# Patient Record
Sex: FEMALE | Race: BLACK OR AFRICAN AMERICAN | Employment: OTHER | URBAN - METROPOLITAN AREA
[De-identification: names, ages, dates, MRNs, and addresses within clinical notes are randomized per-mention and may not be internally consistent; named-entity substitution may affect disease eponyms.]

---

## 2017-12-14 ENCOUNTER — ED HISTORICAL/CONVERTED ENCOUNTER (OUTPATIENT)
Dept: OTHER | Age: 82
End: 2017-12-14

## 2019-09-06 ENCOUNTER — ED HISTORICAL/CONVERTED ENCOUNTER (OUTPATIENT)
Dept: OTHER | Age: 84
End: 2019-09-06

## 2019-09-09 ENCOUNTER — ED HISTORICAL/CONVERTED ENCOUNTER (OUTPATIENT)
Dept: OTHER | Age: 84
End: 2019-09-09

## 2020-09-18 ENCOUNTER — HOSPITAL ENCOUNTER (EMERGENCY)
Age: 85
Discharge: HOME | End: 2020-09-18
Attending: EMERGENCY MEDICINE
Payer: MEDICARE

## 2020-09-18 VITALS
WEIGHT: 195 LBS | HEART RATE: 89 BPM | OXYGEN SATURATION: 99 % | BODY MASS INDEX: 33.29 KG/M2 | DIASTOLIC BLOOD PRESSURE: 68 MMHG | HEIGHT: 64 IN | RESPIRATION RATE: 16 BRPM | SYSTOLIC BLOOD PRESSURE: 158 MMHG | TEMPERATURE: 97.9 F

## 2020-09-18 DIAGNOSIS — S61.310A LACERATION OF RIGHT INDEX FINGER WITHOUT FOREIGN BODY WITH DAMAGE TO NAIL, INITIAL ENCOUNTER: Primary | ICD-10-CM

## 2020-09-18 PROCEDURE — 74011000250 HC RX REV CODE- 250

## 2020-09-18 PROCEDURE — 99282 EMERGENCY DEPT VISIT SF MDM: CPT

## 2020-09-18 PROCEDURE — 74011250636 HC RX REV CODE- 250/636: Performed by: EMERGENCY MEDICINE

## 2020-09-18 PROCEDURE — 90715 TDAP VACCINE 7 YRS/> IM: CPT | Performed by: EMERGENCY MEDICINE

## 2020-09-18 RX ORDER — FUROSEMIDE 20 MG/1
TABLET ORAL DAILY
COMMUNITY

## 2020-09-18 RX ORDER — BACITRACIN 500 [USP'U]/G
OINTMENT TOPICAL
Status: DISCONTINUED | OUTPATIENT
Start: 2020-09-18 | End: 2020-09-18

## 2020-09-18 RX ORDER — SIMVASTATIN 40 MG/1
40 TABLET, FILM COATED ORAL
COMMUNITY

## 2020-09-18 RX ORDER — GUAIFENESIN 100 MG/5ML
81 LIQUID (ML) ORAL DAILY
COMMUNITY

## 2020-09-18 RX ORDER — HYDRALAZINE HYDROCHLORIDE 100 MG/1
10 TABLET, FILM COATED ORAL
COMMUNITY

## 2020-09-18 RX ORDER — BACITRACIN 500 UNIT/G
PACKET (EA) TOPICAL
Status: COMPLETED
Start: 2020-09-18 | End: 2020-09-18

## 2020-09-18 RX ADMIN — BACITRACIN 1 PACKET: 500 OINTMENT TOPICAL at 13:02

## 2020-09-18 RX ADMIN — TETANUS TOXOID, REDUCED DIPHTHERIA TOXOID AND ACELLULAR PERTUSSIS VACCINE, ADSORBED 0.5 ML: 5; 2.5; 8; 8; 2.5 SUSPENSION INTRAMUSCULAR at 13:01

## 2020-09-18 NOTE — LETTER
[address] [address] [place] [id number] [phone] Work/School Note Date: 9/18/2020 To Whom It May concern: 
 
Brianda Brewer. Kvng's mother  was seen and treated today in the emergency room by the following provider(s): 
Attending Provider: Antelmo Youngblood MD. Brianda Brewer. Herbert Guan can return to work 9- Sincerely, CANDIGZAP AGUERO

## 2020-09-18 NOTE — ED TRIAGE NOTES
Minor lac to right second finger from pill cutter blade, ems wrapped finger. Bleeding controlled at triage

## 2020-09-18 NOTE — ED PROVIDER NOTES
EMERGENCY DEPARTMENT HISTORY AND PHYSICAL EXAM 
 
 
Date: 9/18/2020 Patient Name: Ricky Wilcox History of Presenting Illness Chief Complaint Patient presents with  Laceration History Provided By: Patient HPI: Ricky Wilcox, 81 y.o. female with a past medical history significant hypertension presents to the ED with cc of right index finger laceration. Occurred approximately 1 hour prior to arrival while patient was attempting to use her pill splitter. She has applied pressure but was unable to get the bleeding to stop. Bleeding is controlled now. She does take a baby aspirin a day. She denies any other injuries. He is unsure when her last tetanus booster was. PCP: No primary care provider on file. No current facility-administered medications on file prior to encounter. Current Outpatient Medications on File Prior to Encounter Medication Sig Dispense Refill  aspirin 81 mg chewable tablet Take 81 mg by mouth daily.  furosemide (Lasix) 20 mg tablet Take  by mouth daily.  hydrALAZINE (APRESOLINE) 100 mg tablet Take 10 mg by mouth.  simvastatin (ZOCOR) 40 mg tablet Take 40 mg by mouth nightly. Past History Past Medical History: 
Past Medical History:  
Diagnosis Date  Diabetes ([address]  Hypertension Past Surgical History: No past surgical history on file. Family History: No family history on file. Social History: 
Social History Tobacco Use  Smoking status: Never Smoker  Smokeless tobacco: Never Used Substance Use Topics  Alcohol use: Not on file  Drug use: Never Allergies: Allergies no known allergies Review of Systems Review of Systems Neurological: Negative for dizziness, weakness and numbness. Hematological: Does not bruise/bleed easily. All other systems reviewed and are negative. Physical Exam  
Physical Exam 
Constitutional:   
   Appearance: Normal appearance.   
HENT:  
 Head: Normocephalic and atraumatic. Cardiovascular:  
   Rate and Rhythm: Normal rate and regular rhythm. Pulses: Normal pulses. Pulmonary:  
   Effort: Pulmonary effort is normal.  
Skin: 
   General: Skin is warm and dry. Capillary Refill: Capillary refill takes less than 2 seconds. Comments: 0.5 cm superficial   Linear laceration to tip of right index finger and into distal  Portion of fingernail. No nailbed injury Neurological:  
   General: No focal deficit present. Mental Status: She is alert and oriented to person, place, and time. Psychiatric:     
   Mood and Affect: Mood normal.     
   Behavior: Behavior normal.  
 
 
 
Diagnostic Study Results Labs - No results found for this or any previous visit (from the past 12 hour(s)). Radiologic Studies - No orders to display CT Results  (Last 48 hours) None CXR Results  (Last 48 hours) None Medical Decision Making I reviewed the vital signs, available nursing notes, past medical history, past surgical history, family history and social history. Vital Signs-Reviewed the patient's vital signs. Patient Vitals for the past 12 hrs: 
 Temp Resp BP SpO2  
09/18/20 1224 97.9 °F (36.6 °C) 16 (!) 168/63 99 % Records Reviewed: Nursing Notes Provider Notes (Medical Decision Making):  
Patient with superficial laceration to her distal right index finger. Bleeding controlled. Wound does not require suturing or adhesive. Bacitracin dressing and finger splint applied. Splint is to protect wound from further blunt trauma. Discussed local wound care with patient. ED Course:  
Initial assessment performed. The patients presenting problems have been discussed, and they are in agreement with the care plan formulated and outlined with them. I have encouraged them to ask questions as they arise throughout their visit. PLAN: 
1. Current Discharge Medication List  
  
 
2. Follow-up Information Follow up With Specialties Details Why Contact Info FOLLOW UP WITH YOUR DOCTOR AS NEEDED Return to ED if worse Diagnosis Clinical Impression: 1. Laceration of right index finger without foreign body with damage to nail, initial encounter

## 2020-09-22 ENCOUNTER — HOSPITAL ENCOUNTER (OUTPATIENT)
Dept: LAB | Age: 85
Discharge: HOME | End: 2020-09-22
Payer: MEDICARE

## 2020-09-22 DIAGNOSIS — I51.9 HEART DISEASE, UNSPECIFIED: ICD-10-CM

## 2020-09-22 LAB
ALBUMIN SERPL-MCNC: 3.2 G/DL (ref 3.5–5)
ALBUMIN/GLOB SERPL: 0.8 {RATIO} (ref 1.1–2.2)
ALP SERPL-CCNC: 101 U/L (ref 45–117)
ALT SERPL-CCNC: 28 U/L (ref 12–78)
ANION GAP SERPL CALC-SCNC: 8 MMOL/L (ref 5–15)
AST SERPL W P-5'-P-CCNC: 29 U/L (ref 15–37)
BASOPHILS # BLD: 0 K/UL (ref 0–0.1)
BASOPHILS NFR BLD: 0 % (ref 0–1)
BILIRUB SERPL-MCNC: 0.3 MG/DL (ref 0.2–1)
BUN SERPL-MCNC: 28 MG/DL (ref 6–20)
BUN/CREAT SERPL: 14 (ref 12–20)
CA-I BLD-MCNC: 9.5 MG/DL (ref 8.5–10.1)
CHLORIDE SERPL-SCNC: 104 MMOL/L (ref 97–108)
CHOLEST SERPL-MCNC: 200 MG/DL
CO2 SERPL-SCNC: 28 MMOL/L (ref 21–32)
CREAT SERPL-MCNC: 2.07 MG/DL (ref 0.55–1.02)
DIFFERENTIAL METHOD BLD: ABNORMAL
EOSINOPHIL # BLD: 0.1 K/UL (ref 0–0.4)
EOSINOPHIL NFR BLD: 1 % (ref 0–7)
ERYTHROCYTE [DISTWIDTH] IN BLOOD BY AUTOMATED COUNT: 14.2 % (ref 11.5–14.5)
GLOBULIN SER CALC-MCNC: 4.2 G/DL (ref 2–4)
GLUCOSE SERPL-MCNC: 91 MG/DL (ref 65–100)
HCT VFR BLD AUTO: 30.1 % (ref 35–47)
HDLC SERPL-MCNC: 109 MG/DL
HDLC SERPL: 1.8 {RATIO} (ref 0–5)
HGB BLD-MCNC: 9.8 % (ref 11.5–16)
IMM GRANULOCYTES # BLD AUTO: 0 K/UL (ref 0–0.04)
IMM GRANULOCYTES NFR BLD AUTO: 0 % (ref 0–0.5)
LDLC SERPL CALC-MCNC: 77 MG/DL (ref 0–100)
LIPID PROFILE,FLP: ABNORMAL
LYMPHOCYTES # BLD: 2.5 K/UL (ref 0.8–3.5)
LYMPHOCYTES NFR BLD: 37 % (ref 12–49)
MCH RBC QN AUTO: 30.8 PG (ref 26–34)
MCHC RBC AUTO-ENTMCNC: 32.6 G/DL (ref 30–36.5)
MCV RBC AUTO: 94.7 FL (ref 80–99)
MONOCYTES # BLD: 0.5 K/UL (ref 0–1)
MONOCYTES NFR BLD: 7 % (ref 5–13)
NEUTS SEG # BLD: 3.9 K/UL (ref 1.8–8)
NEUTS SEG NFR BLD: 55 % (ref 32–75)
PLATELET # BLD AUTO: 280 K/UL (ref 150–400)
PMV BLD AUTO: 10.3 FL (ref 8.9–12.9)
POTASSIUM SERPL-SCNC: 3.8 MMOL/L (ref 3.5–5.1)
PROT SERPL-MCNC: 7.4 G/DL (ref 6.4–8.2)
RBC # BLD AUTO: 3.18 M/UL (ref 3.8–5.2)
SODIUM SERPL-SCNC: 140 MMOL/L (ref 136–145)
TRIGL SERPL-MCNC: 70 MG/DL (ref ?–150)
TSH SERPL DL<=0.05 MIU/L-ACNC: 2.77 UIU/ML (ref 0.36–3.74)
VLDLC SERPL CALC-MCNC: 14 MG/DL
WBC # BLD AUTO: 6.9 K/UL (ref 3.6–11)

## 2020-09-22 PROCEDURE — 84443 ASSAY THYROID STIM HORMONE: CPT

## 2020-09-22 PROCEDURE — 80053 COMPREHEN METABOLIC PANEL: CPT

## 2020-09-22 PROCEDURE — 80061 LIPID PANEL: CPT

## 2020-09-22 PROCEDURE — 36415 COLL VENOUS BLD VENIPUNCTURE: CPT

## 2020-09-22 PROCEDURE — 85025 COMPLETE CBC W/AUTO DIFF WBC: CPT

## 2020-09-22 PROCEDURE — 82306 VITAMIN D 25 HYDROXY: CPT

## 2021-01-01 ENCOUNTER — ANESTHESIA (OUTPATIENT)
Dept: EMERGENCY DEPT | Age: 86
End: 2021-01-01
Payer: MEDICARE

## 2021-01-01 ENCOUNTER — ANESTHESIA EVENT (OUTPATIENT)
Dept: EMERGENCY DEPT | Age: 86
End: 2021-01-01
Payer: MEDICARE

## 2021-01-01 ENCOUNTER — HOSPITAL ENCOUNTER (EMERGENCY)
Age: 86
End: 2021-11-15
Attending: STUDENT IN AN ORGANIZED HEALTH CARE EDUCATION/TRAINING PROGRAM
Payer: MEDICARE

## 2021-01-01 ENCOUNTER — HOSPITAL ENCOUNTER (EMERGENCY)
Age: 86
Discharge: HOME | End: 2021-05-25
Attending: EMERGENCY MEDICINE
Payer: MEDICARE

## 2021-01-01 VITALS
OXYGEN SATURATION: 98 % | HEART RATE: 72 BPM | TEMPERATURE: 98 F | HEIGHT: 64 IN | BODY MASS INDEX: 34.15 KG/M2 | WEIGHT: 200 LBS | SYSTOLIC BLOOD PRESSURE: 158 MMHG | RESPIRATION RATE: 17 BRPM | DIASTOLIC BLOOD PRESSURE: 85 MMHG

## 2021-01-01 VITALS
HEIGHT: 62 IN | RESPIRATION RATE: 20 BRPM | DIASTOLIC BLOOD PRESSURE: 89 MMHG | SYSTOLIC BLOOD PRESSURE: 212 MMHG | HEART RATE: 30 BPM | BODY MASS INDEX: 36.8 KG/M2 | WEIGHT: 200 LBS | OXYGEN SATURATION: 75 %

## 2021-01-01 DIAGNOSIS — R00.1 BRADYCARDIC CARDIAC ARREST (HCC): Primary | ICD-10-CM

## 2021-01-01 DIAGNOSIS — I46.2 BRADYCARDIC CARDIAC ARREST (HCC): Primary | ICD-10-CM

## 2021-01-01 DIAGNOSIS — M54.31 SCIATICA OF RIGHT SIDE: ICD-10-CM

## 2021-01-01 DIAGNOSIS — M54.9 CHRONIC RIGHT-SIDED BACK PAIN, UNSPECIFIED BACK LOCATION: Primary | ICD-10-CM

## 2021-01-01 DIAGNOSIS — G89.29 CHRONIC RIGHT-SIDED BACK PAIN, UNSPECIFIED BACK LOCATION: Primary | ICD-10-CM

## 2021-01-01 LAB
ALBUMIN SERPL-MCNC: 2.2 G/DL (ref 3.5–5)
ALBUMIN/GLOB SERPL: 0.6 {RATIO} (ref 1.1–2.2)
ALP SERPL-CCNC: 97 U/L (ref 45–117)
ALT SERPL-CCNC: 210 U/L (ref 12–78)
ANION GAP SERPL CALC-SCNC: 11 MMOL/L (ref 5–15)
APTT PPP: 29.7 SEC (ref 21.2–34.1)
AST SERPL W P-5'-P-CCNC: 190 U/L (ref 15–37)
ATRIAL RATE: 77 BPM
BASOPHILS # BLD: 0 K/UL (ref 0–0.1)
BASOPHILS NFR BLD: 0 % (ref 0–1)
BILIRUB SERPL-MCNC: 0.2 MG/DL (ref 0.2–1)
BNP SERPL-MCNC: 858 PG/ML
BUN SERPL-MCNC: 74 MG/DL (ref 6–20)
BUN/CREAT SERPL: 16 (ref 12–20)
CA-I BLD-MCNC: 11.2 MG/DL (ref 8.5–10.1)
CALCULATED R AXIS, ECG10: 54 DEGREES
CALCULATED T AXIS, ECG11: 67 DEGREES
CHLORIDE SERPL-SCNC: 99 MMOL/L (ref 97–108)
CO2 SERPL-SCNC: 23 MMOL/L (ref 21–32)
CREAT SERPL-MCNC: 4.7 MG/DL (ref 0.55–1.02)
DIAGNOSIS, 93000: NORMAL
DIFFERENTIAL METHOD BLD: ABNORMAL
EOSINOPHIL # BLD: 0.2 K/UL (ref 0–0.4)
EOSINOPHIL NFR BLD: 1 % (ref 0–7)
ERYTHROCYTE [DISTWIDTH] IN BLOOD BY AUTOMATED COUNT: 13.9 % (ref 11.5–14.5)
GLOBULIN SER CALC-MCNC: 3.4 G/DL (ref 2–4)
GLUCOSE SERPL-MCNC: 346 MG/DL (ref 65–100)
HCT VFR BLD AUTO: 26.2 % (ref 35–47)
HGB BLD-MCNC: 8.2 G/DL (ref 11.5–16)
IMM GRANULOCYTES # BLD AUTO: 0 K/UL
IMM GRANULOCYTES NFR BLD AUTO: 0 %
INR PPP: 1.1 (ref 0.9–1.1)
LYMPHOCYTES # BLD: 6.6 K/UL (ref 0.8–3.5)
LYMPHOCYTES NFR BLD: 38 % (ref 12–49)
MAGNESIUM SERPL-MCNC: 2.3 MG/DL (ref 1.6–2.4)
MCH RBC QN AUTO: 32 PG (ref 26–34)
MCHC RBC AUTO-ENTMCNC: 31.3 G/DL (ref 30–36.5)
MCV RBC AUTO: 102.3 FL (ref 80–99)
METAMYELOCYTES NFR BLD MANUAL: 3 %
MONOCYTES # BLD: 0.5 K/UL (ref 0–1)
MONOCYTES NFR BLD: 3 % (ref 5–13)
MYELOCYTES NFR BLD MANUAL: 6 %
NEUTS SEG # BLD: 8.5 K/UL (ref 1.8–8)
NEUTS SEG NFR BLD: 49 % (ref 32–75)
NRBC # BLD: 0.04 K/UL (ref 0–0.01)
NRBC BLD-RTO: 0.2 PER 100 WBC
PLATELET # BLD AUTO: 203 K/UL (ref 150–400)
PLATELET COMMENTS,PCOM: ABNORMAL
PMV BLD AUTO: 10.9 FL (ref 8.9–12.9)
POTASSIUM SERPL-SCNC: 5.6 MMOL/L (ref 3.5–5.1)
PROT SERPL-MCNC: 5.6 G/DL (ref 6.4–8.2)
PROTHROMBIN TIME: 14.3 SEC (ref 11.9–14.7)
Q-T INTERVAL, ECG07: 296 MS
QRS DURATION, ECG06: 154 MS
QTC CALCULATION (BEZET), ECG08: 423 MS
RBC # BLD AUTO: 2.56 M/UL (ref 3.8–5.2)
RBC MORPH BLD: ABNORMAL
SODIUM SERPL-SCNC: 133 MMOL/L (ref 136–145)
THERAPEUTIC RANGE,PTTT: NORMAL SEC (ref 82–109)
TROPONIN-HIGH SENSITIVITY: 95 NG/L (ref 0–51)
VENTRICULAR RATE, ECG03: 123 BPM
WBC # BLD AUTO: 17.4 K/UL (ref 3.6–11)

## 2021-01-01 PROCEDURE — 99283 EMERGENCY DEPT VISIT LOW MDM: CPT

## 2021-01-01 PROCEDURE — 85025 COMPLETE CBC W/AUTO DIFF WBC: CPT

## 2021-01-01 PROCEDURE — 74011250636 HC RX REV CODE- 250/636: Performed by: STUDENT IN AN ORGANIZED HEALTH CARE EDUCATION/TRAINING PROGRAM

## 2021-01-01 PROCEDURE — 85730 THROMBOPLASTIN TIME PARTIAL: CPT

## 2021-01-01 PROCEDURE — 83880 ASSAY OF NATRIURETIC PEPTIDE: CPT

## 2021-01-01 PROCEDURE — 99285 EMERGENCY DEPT VISIT HI MDM: CPT

## 2021-01-01 PROCEDURE — 74011000250 HC RX REV CODE- 250: Performed by: STUDENT IN AN ORGANIZED HEALTH CARE EDUCATION/TRAINING PROGRAM

## 2021-01-01 PROCEDURE — 80053 COMPREHEN METABOLIC PANEL: CPT

## 2021-01-01 PROCEDURE — 74011250636 HC RX REV CODE- 250/636: Performed by: EMERGENCY MEDICINE

## 2021-01-01 PROCEDURE — 83735 ASSAY OF MAGNESIUM: CPT

## 2021-01-01 PROCEDURE — 31500 INSERT EMERGENCY AIRWAY: CPT

## 2021-01-01 PROCEDURE — 84484 ASSAY OF TROPONIN QUANT: CPT

## 2021-01-01 PROCEDURE — 74011250637 HC RX REV CODE- 250/637: Performed by: EMERGENCY MEDICINE

## 2021-01-01 PROCEDURE — 85610 PROTHROMBIN TIME: CPT

## 2021-01-01 PROCEDURE — 93005 ELECTROCARDIOGRAM TRACING: CPT

## 2021-01-01 PROCEDURE — 92950 HEART/LUNG RESUSCITATION CPR: CPT

## 2021-01-01 PROCEDURE — 36415 COLL VENOUS BLD VENIPUNCTURE: CPT

## 2021-01-01 PROCEDURE — 96372 THER/PROPH/DIAG INJ SC/IM: CPT

## 2021-01-01 PROCEDURE — 74011250636 HC RX REV CODE- 250/636

## 2021-01-01 RX ORDER — DOPAMINE HYDROCHLORIDE 320 MG/100ML
0-50 INJECTION, SOLUTION INTRAVENOUS
Status: DISCONTINUED | OUTPATIENT
Start: 2021-01-01 | End: 2021-01-01 | Stop reason: HOSPADM

## 2021-01-01 RX ORDER — EPINEPHRINE 0.1 MG/ML
INJECTION INTRACARDIAC; INTRAVENOUS
Status: COMPLETED | OUTPATIENT
Start: 2021-01-01 | End: 2021-01-01

## 2021-01-01 RX ORDER — NAPROXEN 500 MG/1
500 TABLET ORAL ONCE
Status: COMPLETED | OUTPATIENT
Start: 2021-01-01 | End: 2021-01-01

## 2021-01-01 RX ORDER — CALCIUM CHLORIDE INJECTION 100 MG/ML
INJECTION, SOLUTION INTRAVENOUS
Status: COMPLETED | OUTPATIENT
Start: 2021-01-01 | End: 2021-01-01

## 2021-01-01 RX ORDER — SUCCINYLCHOLINE CHLORIDE 20 MG/ML INJECTION SOLUTION
100 SOLUTION
Status: COMPLETED | OUTPATIENT
Start: 2021-01-01 | End: 2021-01-01

## 2021-01-01 RX ORDER — PREDNISONE 50 MG/1
50 TABLET ORAL DAILY
Qty: 6 TABLET | Refills: 1 | Status: SHIPPED | OUTPATIENT
Start: 2021-01-01 | End: 2021-01-01

## 2021-01-01 RX ORDER — SODIUM BICARBONATE 1 MEQ/ML
SYRINGE (ML) INTRAVENOUS
Status: COMPLETED | OUTPATIENT
Start: 2021-01-01 | End: 2021-01-01

## 2021-01-01 RX ORDER — ATROPINE SULFATE 0.4 MG/ML
1 INJECTION, SOLUTION ENDOTRACHEAL; INTRAMEDULLARY; INTRAMUSCULAR; INTRAVENOUS; SUBCUTANEOUS ONCE
Status: COMPLETED | OUTPATIENT
Start: 2021-01-01 | End: 2021-01-01

## 2021-01-01 RX ORDER — ETOMIDATE 2 MG/ML
20 INJECTION INTRAVENOUS ONCE
Status: COMPLETED | OUTPATIENT
Start: 2021-01-01 | End: 2021-01-01

## 2021-01-01 RX ORDER — GABAPENTIN 100 MG/1
100 CAPSULE ORAL 3 TIMES DAILY
Qty: 20 CAPSULE | Refills: 0 | Status: SHIPPED | OUTPATIENT
Start: 2021-01-01 | End: 2021-01-01

## 2021-01-01 RX ORDER — CALCIUM CHLORIDE INJECTION 100 MG/ML
INJECTION, SOLUTION INTRAVENOUS
Status: DISCONTINUED
Start: 2021-01-01 | End: 2021-01-01 | Stop reason: HOSPADM

## 2021-01-01 RX ORDER — DEXAMETHASONE SODIUM PHOSPHATE 4 MG/ML
10 INJECTION, SOLUTION INTRA-ARTICULAR; INTRALESIONAL; INTRAMUSCULAR; INTRAVENOUS; SOFT TISSUE ONCE
Status: COMPLETED | OUTPATIENT
Start: 2021-01-01 | End: 2021-01-01

## 2021-01-01 RX ORDER — ATROPINE SULFATE 0.1 MG/ML
INJECTION INTRAVENOUS
Status: DISCONTINUED
Start: 2021-01-01 | End: 2021-01-01 | Stop reason: HOSPADM

## 2021-01-01 RX ORDER — ATROPINE SULFATE 0.1 MG/ML
INJECTION INTRAVENOUS
Status: COMPLETED
Start: 2021-01-01 | End: 2021-01-01

## 2021-01-01 RX ORDER — SODIUM BICARBONATE 1 MEQ/ML
SYRINGE (ML) INTRAVENOUS
Status: DISCONTINUED
Start: 2021-01-01 | End: 2021-01-01 | Stop reason: HOSPADM

## 2021-01-01 RX ADMIN — DEXAMETHASONE SODIUM PHOSPHATE 10 MG: 4 INJECTION, SOLUTION INTRAMUSCULAR; INTRAVENOUS at 14:56

## 2021-01-01 RX ADMIN — SODIUM BICARBONATE 50 MEQ: 84 INJECTION INTRAVENOUS at 01:36

## 2021-01-01 RX ADMIN — EPINEPHRINE 1 MG: 0.1 INJECTION, SOLUTION INTRAVENOUS at 01:18

## 2021-01-01 RX ADMIN — CALCIUM CHLORIDE 1 G: 100 INJECTION, SOLUTION INTRAVENOUS at 01:27

## 2021-01-01 RX ADMIN — SUCCINYLCHOLINE CHLORIDE 100 MG: 20 INJECTION, SOLUTION INTRAMUSCULAR; INTRAVENOUS at 01:30

## 2021-01-01 RX ADMIN — EPINEPHRINE 1 MG: 0.1 INJECTION, SOLUTION INTRAVENOUS at 02:03

## 2021-01-01 RX ADMIN — EPINEPHRINE 1 MG: 0.1 INJECTION, SOLUTION INTRAVENOUS at 01:23

## 2021-01-01 RX ADMIN — CALCIUM CHLORIDE 1 G: 100 INJECTION, SOLUTION INTRAVENOUS at 02:05

## 2021-01-01 RX ADMIN — SODIUM BICARBONATE 50 MEQ: 84 INJECTION INTRAVENOUS at 02:05

## 2021-01-01 RX ADMIN — EPINEPHRINE 1 MG: 0.1 INJECTION, SOLUTION INTRAVENOUS at 01:58

## 2021-01-01 RX ADMIN — NAPROXEN 500 MG: 500 TABLET ORAL at 13:57

## 2021-01-01 RX ADMIN — SODIUM BICARBONATE 50 MEQ: 84 INJECTION INTRAVENOUS at 01:26

## 2021-01-01 RX ADMIN — ATROPINE SULFATE 1 MG: 0.4 INJECTION, SOLUTION INTRAMUSCULAR; INTRAVENOUS; SUBCUTANEOUS at 01:29

## 2021-01-01 RX ADMIN — EPINEPHRINE 1 MG: 0.1 INJECTION, SOLUTION INTRAVENOUS at 01:37

## 2021-01-01 RX ADMIN — EPINEPHRINE 1 MG: 0.1 INJECTION, SOLUTION INTRAVENOUS at 01:34

## 2021-01-01 RX ADMIN — ATROPINE SULFATE 1 MG: 0.1 INJECTION, SOLUTION ENDOTRACHEAL; INTRAMUSCULAR; INTRAVENOUS; SUBCUTANEOUS at 01:29

## 2021-01-01 RX ADMIN — ATROPINE SULFATE 1 MG: 0.4 INJECTION, SOLUTION INTRAMUSCULAR; INTRAVENOUS; SUBCUTANEOUS at 01:30

## 2021-01-01 RX ADMIN — ETOMIDATE 20 MG: 2 INJECTION INTRAVENOUS at 01:30

## 2021-01-01 RX ADMIN — DOPAMINE HYDROCHLORIDE IN DEXTROSE 12.5 MCG/KG/MIN: 3.2 INJECTION, SOLUTION INTRAVENOUS at 01:47

## 2021-01-01 RX ADMIN — EPINEPHRINE 1 MG: 0.1 INJECTION, SOLUTION INTRAVENOUS at 01:26

## 2021-05-25 NOTE — ED TRIAGE NOTES
Sciatica pain R leg, bilateral knee pain, states that she has an ortho appt 6/5 but can't wait that long

## 2021-05-25 NOTE — ED PROVIDER NOTES
EMERGENCY DEPARTMENT HISTORY AND PHYSICAL EXAM 
 
 
Date: 5/25/2021 Patient Name: Guillermo Aaron History of Presenting Illness Chief Complaint Patient presents with  Leg Pain History Provided By: Patient and Patient's Daughter HPI: Guillermo Aaron, 81 y.o. female presents to the ED with cc of Chief Complaint Patient presents with  Leg Pain Sciatica pain R leg, bilateral knee pain, states that she has an ortho appt 6/5 but can't wait that long Denies any fever or any history of recent trauma history of sciatica in the past 
 Moderate severity, no known exacerbating or relieving factors, no other associated signs and symptoms. There are no other complaints, changes, or physical findings at this time. PCP: Toan Jones MD 
 
No current facility-administered medications on file prior to encounter. Current Outpatient Medications on File Prior to Encounter Medication Sig Dispense Refill  aspirin 81 mg chewable tablet Take 81 mg by mouth daily.  furosemide (Lasix) 20 mg tablet Take  by mouth daily.  hydrALAZINE (APRESOLINE) 100 mg tablet Take 10 mg by mouth.  simvastatin (ZOCOR) 40 mg tablet Take 40 mg by mouth nightly. Past History Past Medical History: 
Past Medical History:  
Diagnosis Date  [address] [address]  Hypertension Past Surgical History: No past surgical history on file. Family History: No family history on file. Social History: 
Social History Tobacco Use  Smoking status: Never Smoker  Smokeless tobacco: Never Used Substance Use Topics  Alcohol use: Never  Drug use: Never Allergies: 
No Known Allergies Review of Systems Review of Systems Constitutional: Negative for chills and fever. HENT: Negative for congestion, facial swelling, rhinorrhea and sore throat. Eyes: Negative. Negative for photophobia and pain.   
Respiratory: Negative for cough, shortness of breath and wheezing. Cardiovascular: Negative. Negative for chest pain. Gastrointestinal: Negative for abdominal distention and abdominal pain. Genitourinary: Negative. Musculoskeletal: Positive for arthralgias, back pain and myalgias. Allergic/Immunologic: Negative for immunocompromised state. Neurological: Negative. Negative for syncope and weakness. Hematological: Negative. Psychiatric/Behavioral: Negative. Physical Exam  
Physical Exam 
Vitals and nursing note reviewed. Constitutional:   
   Appearance: Normal appearance. She is normal weight. HENT:  
   Head: Normocephalic and atraumatic. Jaw: There is normal jaw occlusion. Right Ear: Tympanic membrane and external ear normal.  
   Left Ear: Tympanic membrane and external ear normal.  
   Nose: Nose normal.  
   Mouth/Throat:  
   Mouth: Mucous membranes are moist.  
   Pharynx: Oropharynx is clear. Eyes:  
   General: Lids are normal.     
   Right eye: No foreign body or hordeolum. Left eye: No foreign body or hordeolum. Neck:  
   Thyroid: No thyroid mass. Vascular: No carotid bruit. Trachea: No tracheal tenderness. Cardiovascular:  
   Rate and Rhythm: Normal rate and regular rhythm. Pulses: Normal pulses. Heart sounds: Normal heart sounds. Pulmonary:  
   Effort: Pulmonary effort is normal.  
   Breath sounds: Normal breath sounds. Abdominal:  
   General: Abdomen is flat. Bowel sounds are normal.  
   Palpations: Abdomen is soft. There is no mass. Tenderness: There is no abdominal tenderness. There is no right CVA tenderness, left CVA tenderness, guarding or rebound. Hernia: No hernia is present. Musculoskeletal:     
   General: Normal range of motion. Arms: 
 
   Cervical back: Normal range of motion and neck supple. No rigidity. No muscular tenderness. Normal range of motion.   
     Legs: 
 
   Comments: Right lumbar, paralumbar tenderness straight leg raising positive pain 35 degrees Skin: 
   General: Skin is warm. Findings: No lesion or rash. Neurological:  
   General: No focal deficit present. Mental Status: She is alert and oriented to person, place, and time. GCS: GCS eye subscore is 4. GCS verbal subscore is 5. GCS motor subscore is 6. Sensory: Sensation is intact. Motor: Motor function is intact. Deep Tendon Reflexes: Reflexes are normal and symmetric. Psychiatric:     
   Attention and Perception: Attention and perception normal.     
   Mood and Affect: Mood is anxious. Diagnostic Study Results Labs - No results found for this or any previous visit (from the past 12 hour(s)). Labs reviewed by me Radiologic Studies - No orders to display CT Results  (Last 48 hours) None CXR Results  (Last 48 hours) None Medical Decision Making I am the first provider for this patient. I reviewed the vital signs, available nursing notes, past medical history, past surgical history, family history and social history. RADIOLOGY report and LABS reviewed by me Vital Signs-Reviewed the patient's vital signs. Patient Vitals for the past 12 hrs: 
 Temp Pulse Resp BP SpO2  
05/25/21 1234    (!) 152/62   
05/25/21 1233 98 °F (36.7 °C) 70 17  99 % EKG interpretation: (Preliminary) Records Reviewed: Nurse's note. Provider Notes (Medical Decision Making): 
 
Patient presents with DIFF DX : Back pain, leg pain, sciatica ED Course:  
Initial assessment performed. The patients presenting problems have been discussed, and they are in agreement with the care plan formulated and outlined with them. I have encouraged them to ask questions as they arise throughout their visit. TREATMENT RESPONSE -Stable Batool Recinos MD 
 
 
Disposition: 
Discharged Diagnostic tests were reviewed and questions answered.  Diagnosis, care plan and treatment options were discussed. The patient understand instructions and will follow up as directed. Condition stable Admitting Provider: No admitting provider for patient encounter. Consulting Provider: No ref. provider found DISCHARGE PLAN: 
1. Current Discharge Medication List  
  
START taking these medications Details  
predniSONE (DELTASONE) 50 mg tablet Take 1 Tablet by mouth daily for 6 days. Indications: Cervical spondylosis Qty: 6 Tablet, Refills: 1 Start date: 5/25/2021, End date: 5/31/2021  
  
gabapentin (NEURONTIN) 100 mg capsule Take 1 Capsule by mouth three (3) times daily for 4 days. Max Daily Amount: 300 mg. Qty: 20 Capsule, Refills: 0 Start date: 5/25/2021, End date: 5/29/2021 Associated Diagnoses: Chronic right-sided back pain, unspecified back location; Sciatica of right side 2. Follow-up Information Follow up With Specialties Details Why Contact Info Beth Fonseca MD Internal Medicine   [address] [place] [id number] [phone] 3. Return to ED if worse Diagnosis Clinical Impression: ICD-10-CM ICD-9-CM 1. Chronic right-sided back pain, unspecified back location  M54.9 724.5 gabapentin (NEURONTIN) 100 mg capsule G89.29 338.29   
2. Sciatica of right side  M54.31 724.3 gabapentin (NEURONTIN) 100 mg capsule Attestations: 
 
Tammy Nix MD 
 
Please note that this dictation was completed with [employer], the computer voice recognition software. Quite often unanticipated grammatical, syntax, homophones, and other interpretive errors are inadvertently transcribed by the computer software. Please disregard these errors. Please excuse any errors that have escaped final proofreading. Thank you.

## 2021-11-15 NOTE — ED PROVIDER NOTES
EMERGENCY DEPARTMENT HISTORY AND PHYSICAL EXAM      Date: 11/15/2021  Patient Name: Chris Tellez    History of Presenting Illness     Chief Complaint   Patient presents with    Slow Heart Rate    Shortness of Breath       History Provided By: EMS    HPI: Chris Tellez, 81 y.o. female with a past medical history significant diabetes and hypertension presents to the ED with cc of shortness of breath, bradycardia. As per EMS patient over the last several hours has been complaining of some shortness of breath to family, family called EMS. On arrival EMS noted patient to be hypoxic in the 70s on room air, additionally patient's heart rate was in the 30s. Patient placed on oxygen, placed on transcutaneous pacer, reportedly EMS was pacing at 70, patient was awake, responsive, transported to emergency department. On transport to emergency department patient suddenly became unresponsive, pulse check revealed absent pulses. Code called, acls initiated. There are no other complaints, changes, or physical findings at this time. PCP: Sheila Lemus MD    Current Outpatient Medications   Medication Sig Dispense Refill    aspirin 81 mg chewable tablet Take 81 mg by mouth daily.  furosemide (Lasix) 20 mg tablet Take  by mouth daily.  hydrALAZINE (APRESOLINE) 100 mg tablet Take 10 mg by mouth.  simvastatin (ZOCOR) 40 mg tablet Take 40 mg by mouth nightly. Past History     Past Medical History:  Past Medical History:   Diagnosis Date    Diabetes ([address]     Hypertension        Past Surgical History:  No past surgical history on file. Family History:  History reviewed. No pertinent family history.     Social History:  Social History     Tobacco Use    Smoking status: Never Smoker    Smokeless tobacco: Never Used   Substance Use Topics    Alcohol use: Never    Drug use: Never       Allergies:  No Known Allergies      Review of Systems     Review of Systems   Unable to perform ROS: Acuity of condition       Physical Exam     Physical Exam  Vitals and nursing note reviewed. Constitutional:       Appearance: Normal appearance. She is normal weight. She is ill-appearing. HENT:      Head: Normocephalic and atraumatic. Nose: Nose normal.      Mouth/Throat:      Mouth: Mucous membranes are moist.   Neck:      Vascular: No JVD. Cardiovascular:      Comments: Absent pulses  Pulmonary:      Comments: Equal breath sounds with bagging  Abdominal:      General: Abdomen is flat. There is no distension. Palpations: Abdomen is soft. Musculoskeletal:         General: No swelling, tenderness, deformity or signs of injury. Cervical back: Normal range of motion and neck supple. No rigidity or crepitus. Right lower leg: No edema. Left lower leg: No edema. Skin:     Coloration: Skin is sallow. Neurological:      GCS: GCS eye subscore is 1. GCS verbal subscore is 1. GCS motor subscore is 1. Diagnostic Study Results     Labs -   No results found for this or any previous visit (from the past 12 hour(s)). Radiologic Studies -   [unfilled]  CT Results  (Last 48 hours)    None        CXR Results  (Last 48 hours)    None          Medical Decision Making and ED Course   I am the first provider for this patient. I reviewed the vital signs, available nursing notes, past medical history, past surgical history, family history and social history. Vital Signs-Reviewed the patient's vital signs. No data found. Records Reviewed: Nursing Notes    The patient presents with bradycardia, cardiac arrest with a differential diagnosis of bradycardic arrest, acute MI, acute hyperkalemia, hypoxic respiratory arrest      Provider Notes (Medical Decision Making):     MDM   81-year-old female, history of hypertension, diabetes, presents to emergency department for evaluation of shortness of breath, noted to be bradycardic at home.     Patient was initially started on a transcutaneous pacer by EMS, on arrival to ED room patient suddenly became unresponsive, apneic and pulseless. CPR initiated, a CODE BLUE called. I responded to CODE BLUE, found patient to be apneic pulseless, monitor showed PEA. Patient received  epinephrine, given bradycardic arrest of unknown etiology calcium chloride and bicarb also administered. I completed a bedside ultrasound which did not show any cardiac activity. ACLS continued, patient received mulitple doses of epinepherine (see code sheet)  Anesthesia responded to Jeff Joseph and successfully intubated patient. Patient briefly regained pulses after approximately 15 minutes of CPR, patient noted to be severely bradycardic, pulse rate approximately 5-10 beats a minute. Atropine administered with minimal improvement. Dopamine infusion started, patient restarted on transcutaneous pacer, achieved capture at 70 bpm    I discussed case with Dr. Marie Fernandez,  given persistent bradycardia on transcutaneous pacers will come into emergency department and attempt to place internal jugular pacer    Patient lost pulses again after approximately 2-3 minutes, CPR re-initiated, patient again received multiple doses of epi, bicarb, calcium chloride. Patient's daughter and family members in family room, I updated family, explained grave prognosis at this time, daughter informs me that patient was suddenly feeling very short of breath this evening when she walked to the bathroom. CPR initiated again, patient received multiple rounds of epinephrine, bicarb, calcium chloride administered as well. After approximately additional 40 minutes of CPR without spontaneous return of pulses I completed another bedside ultrasound, noted to have no significant cardiac activity, cardiac rhythm strip asystole. Code terminated at 2:12 AM.    I informed patient's family members of patient death. ED Course:   Initial assessment performed.  The patients presenting problems have been discussed, and they are in agreement with the care plan formulated and outlined with them. I have encouraged them to ask questions as they arise throughout their visit. Procedures       Derik Shabazz MD  Procedures               CRITICAL CARE NOTE :  2:22 AM  Amount of Critical Care Time: __60__(minutes)__    IMPENDING DETERIORATION -Cardiovascular  ASSOCIATED RISK FACTORS - Hypotension and Dysrhythmia  MANAGEMENT- Bedside Assessment and Supervision of Care  INTERPRETATION -  Screening Ultrasound  INTERVENTIONS - hemodynamic mngmt, defibrillation, transcutaneous pacing and vascular control  CASE REVIEW -   TREATMENT RESPONSE -Unchanged   PERFORMED BY - Self    NOTES   :  I have spent critical care time involved in lab review, consultations with specialist, family decision- making, bedside attention and documentation. This time excludes time spent in any separate billed procedures. During this entire length of time I was immediately available to the patient . Derik Shabazz MD        Disposition               Diagnosis     Clinical Impression:   1. Bradycardic cardiac arrest [facility])        Attestations:    Derik Shabazz MD    Please note that this dictation was completed with [employer], the computer voice recognition software. Quite often unanticipated grammatical, syntax, homophones, and other interpretive errors are inadvertently transcribed by the computer software. Please disregard these errors. Please excuse any errors that have escaped final proofreading. Thank you.

## 2021-11-15 NOTE — ANESTHESIA PROCEDURE NOTES
Emergent Intubation  Performed by: Kyleigh Huber CRNA  Authorized by: Kyleigh Huber CRNA     Emergent Intubation:   Location:  ED  Date/Time:  11/15/2021 1:31 AM  Indications:  Respiratory failure    Spontaneous Ventilation: absent    Level of Consciousness: unresponsive  Preoxygenated: Yes      Airway Documentation:   Airway:  ETT - Cuffed  Technique:  Intubating stylet  Insertion Site:  Oral  Blade Type:  Daisy  Blade Size:  3  ETT size (mm):  7.5  ETT Line Yobani:  Teeth  ETT Insertion depth (cm):  22  Placement verified by: auscultation and EtCO2    Attempts:  1  Difficult airway: No    Anesthesia called for urgent/emergent intubation. Patient was identified using two patient identification methods. 100% oxygen via Ambu bag, wall suction, emergency medications, and intubating supplies were readily available. Patient pre-oxygenated with 100% oxygen via Ambu bag. Rapid sequence induction was performed using anesthesia induction agents and muscle relaxants as needed. Patient was intubated with endotracheal tube. End tidal carbon dioxide was confirmed via colorimetry, capnography, bilateral breath sounds, and chest x-ray as needed per the primary team. The patient tolerated the procedure well and no complications were observed.